# Patient Record
Sex: FEMALE | URBAN - METROPOLITAN AREA
[De-identification: names, ages, dates, MRNs, and addresses within clinical notes are randomized per-mention and may not be internally consistent; named-entity substitution may affect disease eponyms.]

---

## 2023-04-11 ENCOUNTER — APPOINTMENT (OUTPATIENT)
Dept: URBAN - METROPOLITAN AREA CLINIC 205 | Age: 23
Setting detail: DERMATOLOGY
End: 2023-04-12

## 2023-04-11 DIAGNOSIS — K13.0 DISEASES OF LIPS: ICD-10-CM

## 2023-04-11 DIAGNOSIS — L50.1 IDIOPATHIC URTICARIA: ICD-10-CM

## 2023-04-11 PROCEDURE — 99213 OFFICE O/P EST LOW 20 MIN: CPT

## 2023-04-11 PROCEDURE — OTHER COUNSELING: OTHER

## 2023-04-11 PROCEDURE — OTHER PRESCRIPTION: OTHER

## 2023-04-11 PROCEDURE — OTHER ORDER TESTS: OTHER

## 2023-04-11 PROCEDURE — OTHER TREATMENT REGIMEN: OTHER

## 2023-04-11 RX ORDER — MUPIROCIN 20 MG/G
OINTMENT TOPICAL
Qty: 22 | Refills: 0 | Status: ERX | COMMUNITY
Start: 2023-04-11

## 2023-04-11 RX ORDER — TRIAMCINOLONE ACETONIDE 1 MG/G
CREAM TOPICAL
Qty: 80 | Refills: 0 | Status: ERX | COMMUNITY
Start: 2023-04-11

## 2023-04-11 ASSESSMENT — LOCATION SIMPLE DESCRIPTION DERM
LOCATION SIMPLE: RIGHT LIP
LOCATION SIMPLE: LEFT LIP

## 2023-04-11 ASSESSMENT — LOCATION DETAILED DESCRIPTION DERM
LOCATION DETAILED: LEFT ORAL COMMISSURE
LOCATION DETAILED: RIGHT ORAL COMMISSURE

## 2023-04-11 ASSESSMENT — LOCATION ZONE DERM: LOCATION ZONE: LIP

## 2023-04-11 NOTE — PROCEDURE: TREATMENT REGIMEN
Plan: If no improvement will plan biopsy by punch
Detail Level: Simple
Initiate Treatment: Claritin every 12 hours, triamcinolone 2 x daily for up to 2 weeks,  fragrance free products vanicream products, all free and clear detergent.  She can call  companies to come out (usually at no charge) to check for bed bugs, fleas, and spiders.  It might not be bug related but rather idiopathic urticaria meaning we dont know a cause.
Initiate Treatment: Cortibalm cream use for 7 days nightly then decrease to once a week,no whitening products, mupirocin nightly for 2 weeks, bacterial culture pending

## 2023-05-09 ENCOUNTER — APPOINTMENT (OUTPATIENT)
Dept: URBAN - METROPOLITAN AREA CLINIC 205 | Age: 23
Setting detail: DERMATOLOGY
End: 2023-05-09

## 2023-05-09 DIAGNOSIS — K13.0 DISEASES OF LIPS: ICD-10-CM

## 2023-05-09 DIAGNOSIS — L50.1 IDIOPATHIC URTICARIA: ICD-10-CM

## 2023-05-09 PROCEDURE — OTHER TREATMENT REGIMEN: OTHER

## 2023-05-09 PROCEDURE — 99213 OFFICE O/P EST LOW 20 MIN: CPT

## 2023-05-09 PROCEDURE — OTHER COUNSELING: OTHER

## 2023-05-09 NOTE — PROCEDURE: TREATMENT REGIMEN
Plan: I cant do a punch biopsy as she has no active issue currently.  Refer to [facility] dermatology for possible patch testing. She voiced understanding and was agreeable. Plan: I cant do a punch biopsy as she has no active issue currently.  Refer to [facility] dermatology for possible patch testing. She voiced understanding and was agreeable.

## 2023-05-09 NOTE — PROCEDURE: TREATMENT REGIMEN
Initiate Treatment: vanicream products only. She can call  companies to come out (usually at no charge) to check for bed bugs, fleas, and spiders.  She says her Dad hasn't done this yet.  He has sprayed for bugs outside.  They changed all her bedding, washed everything in all free and clear laundry detergent.  They have seen no bugs at her house. It has been better while she has been staying with her boyfriend recently.

## 2023-05-09 NOTE — PROCEDURE: TREATMENT REGIMEN
Plan: Culture showed 1 plus staph. She used the mupirocin and cleared.  Continue to avoid whitening products.

## 2023-05-09 NOTE — PROCEDURE: TREATMENT REGIMEN
Continue Regimen: Claritin every 12 hours, triamcinolone 2 x daily for up to 2 weeks,  fragrance free products, ALL free and clear detergent.  I told her it might not be bug related but rather idiopathic urticaria meaning we dont know a cause.